# Patient Record
(demographics unavailable — no encounter records)

---

## 2024-12-03 NOTE — REASON FOR VISIT
Nasal suctioning or syringe flushing with fluids. tylenol as needed. humidifier. increase fluid intake. Vicks    
[Follow-Up] : a follow-up evaluation of
[Follow-Up] : a follow-up evaluation of

## 2024-12-03 NOTE — HISTORY OF PRESENT ILLNESS
[FreeTextEntry1] : Pt here to discuss PCOS. She saw endocrine Dr. Black and had extensive bw done including dexamethasone suppression test. She went off ocp in August because not SA and to see what her own periods were like because she was not bleeding on ocp (loloestrin). She got menses spontaneously on 9/25 and 11/12. IGF-1 was elevated, and metformin was recommended. No acne, no hirsutism. She has noticed PMS since being off ocp and is interested in restarting ocp secondary to that and in case she becomes SA again.

## 2025-07-17 NOTE — HISTORY OF PRESENT ILLNESS
[No] : Patient does not have concerns regarding sex [Currently Active] : currently active [Men] : men [Vaginal] : vaginal [TextBox_4] : back on loloestrin, no withdrawal bleeding Pt had pelvic pain secondary to pelvic floor dysfunction and saw pelvic floor PT. States it helped still has some constipation but better. [PapSmeardate] : 7/2024